# Patient Record
Sex: FEMALE | Race: WHITE | NOT HISPANIC OR LATINO | URBAN - METROPOLITAN AREA
[De-identification: names, ages, dates, MRNs, and addresses within clinical notes are randomized per-mention and may not be internally consistent; named-entity substitution may affect disease eponyms.]

---

## 2023-08-23 ENCOUNTER — APPOINTMENT (RX ONLY)
Dept: URBAN - METROPOLITAN AREA CLINIC 66 | Facility: CLINIC | Age: 53
Setting detail: DERMATOLOGY
End: 2023-08-23

## 2023-08-23 DIAGNOSIS — Z71.89 OTHER SPECIFIED COUNSELING: ICD-10-CM

## 2023-08-23 DIAGNOSIS — L57.0 ACTINIC KERATOSIS: ICD-10-CM

## 2023-08-23 DIAGNOSIS — L21.8 OTHER SEBORRHEIC DERMATITIS: ICD-10-CM

## 2023-08-23 DIAGNOSIS — L82.0 INFLAMED SEBORRHEIC KERATOSIS: ICD-10-CM

## 2023-08-23 DIAGNOSIS — L44.8 OTHER SPECIFIED PAPULOSQUAMOUS DISORDERS: ICD-10-CM

## 2023-08-23 DIAGNOSIS — D18.0 HEMANGIOMA: ICD-10-CM

## 2023-08-23 PROBLEM — D18.01 HEMANGIOMA OF SKIN AND SUBCUTANEOUS TISSUE: Status: ACTIVE | Noted: 2023-08-23

## 2023-08-23 PROCEDURE — 17110 DESTRUCTION B9 LES UP TO 14: CPT

## 2023-08-23 PROCEDURE — ? LIQUID NITROGEN

## 2023-08-23 PROCEDURE — 17000 DESTRUCT PREMALG LESION: CPT | Mod: 59

## 2023-08-23 PROCEDURE — 99203 OFFICE O/P NEW LOW 30 MIN: CPT | Mod: 25

## 2023-08-23 PROCEDURE — ? COUNSELING

## 2023-08-23 ASSESSMENT — LOCATION DETAILED DESCRIPTION DERM
LOCATION DETAILED: LEFT INFERIOR FOREHEAD
LOCATION DETAILED: RIGHT CENTRAL FRONTAL SCALP
LOCATION DETAILED: LEFT ANTERIOR PROXIMAL THIGH
LOCATION DETAILED: RIGHT SUPERIOR LATERAL FOREHEAD
LOCATION DETAILED: LEFT LATERAL SUPERIOR CHEST

## 2023-08-23 ASSESSMENT — LOCATION ZONE DERM
LOCATION ZONE: SCALP
LOCATION ZONE: LEG
LOCATION ZONE: TRUNK
LOCATION ZONE: FACE

## 2023-08-23 ASSESSMENT — LOCATION SIMPLE DESCRIPTION DERM
LOCATION SIMPLE: CHEST
LOCATION SIMPLE: LEFT FOREHEAD
LOCATION SIMPLE: LEFT THIGH
LOCATION SIMPLE: RIGHT SCALP
LOCATION SIMPLE: RIGHT FOREHEAD

## 2023-08-23 NOTE — PROCEDURE: COUNSELING
Detail Level: Simple
Detail Level: Detailed
Topical Steroid Recommendations: Hydrocortisone 1% OTC for 1-2 weeks at a time for flares

## 2023-08-23 NOTE — PROCEDURE: LIQUID NITROGEN
Show Spray Paint Technique Variable?: Yes
Duration Of Freeze Thaw-Cycle (Seconds): 5-10
Detail Level: Detailed
Spray Paint Text: The liquid nitrogen was applied to the skin utilizing a spray paint frosting technique.
Add 52 Modifier (Optional): no
Post-Care Instructions: I reviewed with the patient in detail post-care instructions. Patient is to wear sunprotection, and avoid picking at any of the treated lesions. Pt may apply Vaseline to crusted or scabbing areas.
Medical Necessity Information: It is in your best interest to select a reason for this procedure from the list below. All of these items fulfill various CMS LCD requirements except the new and changing color options.
Consent: The patient's consent was obtained including but not limited to risks of crusting, scabbing, blistering, scarring, darker or lighter pigmentary change, recurrence, incomplete removal and infection.
Medical Necessity Clause: This procedure was medically necessary because the lesions that were treated were:
Number Of Freeze-Thaw Cycles: 2 freeze-thaw cycles
Duration Of Freeze Thaw-Cycle (Seconds): 5
Detail Level: Simple

## 2024-05-15 ENCOUNTER — APPOINTMENT (RX ONLY)
Dept: URBAN - METROPOLITAN AREA CLINIC 66 | Facility: CLINIC | Age: 54
Setting detail: DERMATOLOGY
End: 2024-05-15

## 2024-05-15 DIAGNOSIS — D22 MELANOCYTIC NEVI: ICD-10-CM

## 2024-05-15 DIAGNOSIS — L81.4 OTHER MELANIN HYPERPIGMENTATION: ICD-10-CM

## 2024-05-15 DIAGNOSIS — Z71.89 OTHER SPECIFIED COUNSELING: ICD-10-CM

## 2024-05-15 DIAGNOSIS — D485 NEOPLASM OF UNCERTAIN BEHAVIOR OF SKIN: ICD-10-CM

## 2024-05-15 DIAGNOSIS — Z85.828 PERSONAL HISTORY OF OTHER MALIGNANT NEOPLASM OF SKIN: ICD-10-CM

## 2024-05-15 DIAGNOSIS — D18.0 HEMANGIOMA: ICD-10-CM

## 2024-05-15 PROBLEM — D22.62 MELANOCYTIC NEVI OF LEFT UPPER LIMB, INCLUDING SHOULDER: Status: ACTIVE | Noted: 2024-05-15

## 2024-05-15 PROBLEM — D18.01 HEMANGIOMA OF SKIN AND SUBCUTANEOUS TISSUE: Status: ACTIVE | Noted: 2024-05-15

## 2024-05-15 PROBLEM — D22.5 MELANOCYTIC NEVI OF TRUNK: Status: ACTIVE | Noted: 2024-05-15

## 2024-05-15 PROBLEM — D22.71 MELANOCYTIC NEVI OF RIGHT LOWER LIMB, INCLUDING HIP: Status: ACTIVE | Noted: 2024-05-15

## 2024-05-15 PROBLEM — D22.61 MELANOCYTIC NEVI OF RIGHT UPPER LIMB, INCLUDING SHOULDER: Status: ACTIVE | Noted: 2024-05-15

## 2024-05-15 PROBLEM — D48.5 NEOPLASM OF UNCERTAIN BEHAVIOR OF SKIN: Status: ACTIVE | Noted: 2024-05-15

## 2024-05-15 PROBLEM — D22.72 MELANOCYTIC NEVI OF LEFT LOWER LIMB, INCLUDING HIP: Status: ACTIVE | Noted: 2024-05-15

## 2024-05-15 PROBLEM — D22.39 MELANOCYTIC NEVI OF OTHER PARTS OF FACE: Status: ACTIVE | Noted: 2024-05-15

## 2024-05-15 PROCEDURE — ? COUNSELING

## 2024-05-15 PROCEDURE — 11103 TANGNTL BX SKIN EA SEP/ADDL: CPT

## 2024-05-15 PROCEDURE — 11102 TANGNTL BX SKIN SINGLE LES: CPT

## 2024-05-15 PROCEDURE — ? BIOPSY BY SHAVE METHOD

## 2024-05-15 PROCEDURE — 99213 OFFICE O/P EST LOW 20 MIN: CPT | Mod: 25

## 2024-05-15 ASSESSMENT — LOCATION SIMPLE DESCRIPTION DERM
LOCATION SIMPLE: RIGHT POSTERIOR THIGH
LOCATION SIMPLE: LEFT FOREARM
LOCATION SIMPLE: RIGHT UPPER ARM
LOCATION SIMPLE: LEFT POSTERIOR THIGH
LOCATION SIMPLE: LEFT UPPER BACK
LOCATION SIMPLE: CHEST
LOCATION SIMPLE: RIGHT THIGH
LOCATION SIMPLE: LEFT THIGH
LOCATION SIMPLE: RIGHT POSTERIOR UPPER ARM
LOCATION SIMPLE: LEFT POSTERIOR UPPER ARM
LOCATION SIMPLE: RIGHT PRETIBIAL REGION
LOCATION SIMPLE: LEFT NOSE
LOCATION SIMPLE: LEFT CLAVICULAR SKIN
LOCATION SIMPLE: LEFT PRETIBIAL REGION
LOCATION SIMPLE: RIGHT BUTTOCK
LOCATION SIMPLE: RIGHT FOREARM
LOCATION SIMPLE: LEFT BUTTOCK
LOCATION SIMPLE: LEFT SHOULDER
LOCATION SIMPLE: HAIR

## 2024-05-15 ASSESSMENT — LOCATION DETAILED DESCRIPTION DERM
LOCATION DETAILED: LEFT POSTERIOR SHOULDER
LOCATION DETAILED: LEFT CLAVICULAR SKIN
LOCATION DETAILED: HAIR
LOCATION DETAILED: RIGHT ANTERIOR PROXIMAL THIGH
LOCATION DETAILED: LEFT BUTTOCK
LOCATION DETAILED: RIGHT ANTERIOR DISTAL THIGH
LOCATION DETAILED: LEFT SUPERIOR MEDIAL UPPER BACK
LOCATION DETAILED: LEFT ANTERIOR PROXIMAL THIGH
LOCATION DETAILED: LEFT PROXIMAL POSTERIOR UPPER ARM
LOCATION DETAILED: LEFT NASAL ALAR GROOVE
LOCATION DETAILED: LEFT VENTRAL PROXIMAL FOREARM
LOCATION DETAILED: RIGHT PROXIMAL PRETIBIAL REGION
LOCATION DETAILED: LEFT ANTERIOR DISTAL THIGH
LOCATION DETAILED: RIGHT LATERAL SUPERIOR CHEST
LOCATION DETAILED: RIGHT PROXIMAL POSTERIOR UPPER ARM
LOCATION DETAILED: LEFT PROXIMAL POSTERIOR THIGH
LOCATION DETAILED: RIGHT PROXIMAL POSTERIOR THIGH
LOCATION DETAILED: LEFT PROXIMAL PRETIBIAL REGION
LOCATION DETAILED: RIGHT ANTERIOR PROXIMAL UPPER ARM
LOCATION DETAILED: RIGHT BUTTOCK
LOCATION DETAILED: RIGHT VENTRAL PROXIMAL FOREARM

## 2024-05-15 ASSESSMENT — LOCATION ZONE DERM
LOCATION ZONE: LEG
LOCATION ZONE: SCALP
LOCATION ZONE: ARM
LOCATION ZONE: NOSE
LOCATION ZONE: TRUNK

## 2024-05-15 NOTE — PROCEDURE: COUNSELING
Detail Level: Simple
Detail Level: Zone
Topical Retinoids Recommendations: Retinol
Sunscreen Recommendations: SPF 30-50+ daily
Detail Level: Detailed

## 2024-05-15 NOTE — PROCEDURE: BIOPSY BY SHAVE METHOD
Detail Level: Detailed
Depth Of Biopsy: dermis
Was A Bandage Applied: Yes
Size Of Lesion In Cm: 0.7
X Size Of Lesion In Cm: 0
Biopsy Type: H and E
Biopsy Method: Dermablade
Anesthesia Type: 1% lidocaine with epinephrine
Anesthesia Volume In Cc: 0.5
Hemostasis: Aluminum Chloride and Electrocautery
Wound Care: Petrolatum
Dressing: bandage
Destruction After The Procedure: No
Type Of Destruction Used: Curettage
Curettage Text: The wound bed was treated with curettage after the biopsy was performed.
Cryotherapy Text: The wound bed was treated with cryotherapy after the biopsy was performed.
Electrodesiccation Text: The wound bed was treated with electrodesiccation after the biopsy was performed.
Electrodesiccation And Curettage Text: The wound bed was treated with electrodesiccation and curettage after the biopsy was performed.
Silver Nitrate Text: The wound bed was treated with silver nitrate after the biopsy was performed.
Lab: [id number]
Lab Facility: [id number]
Consent: Written consent was obtained and risks were reviewed including but not limited to scarring, infection, bleeding, scabbing, incomplete removal, nerve damage and allergy to anesthesia.
Post-Care Instructions: I reviewed with the patient in detail post-care instructions. Patient is to keep the biopsy site dry overnight, and then apply bacitracin twice daily until healed. Patient may apply hydrogen peroxide soaks to remove any crusting.
Notification Instructions: Patient will be notified of biopsy results. However, patient instructed to call the office if not contacted within 2 weeks.
Billing Type: Third-Party Bill
Information: Selecting Yes will display possible errors in your note based on the variables you have selected. This validation is only offered as a suggestion for you. PLEASE NOTE THAT THE VALIDATION TEXT WILL BE REMOVED WHEN YOU FINALIZE YOUR NOTE. IF YOU WANT TO FAX A PRELIMINARY NOTE YOU WILL NEED TO TOGGLE THIS TO 'NO' IF YOU DO NOT WANT IT IN YOUR FAXED NOTE.

## 2024-05-20 ENCOUNTER — RX ONLY (OUTPATIENT)
Age: 54
Setting detail: RX ONLY
End: 2024-05-20

## 2024-05-20 RX ORDER — IMIQUIMOD 12.5 MG/.25G
CREAM TOPICAL
Qty: 24 | Refills: 0 | Status: ERX | COMMUNITY
Start: 2024-05-20

## 2024-10-15 ENCOUNTER — APPOINTMENT (RX ONLY)
Dept: URBAN - METROPOLITAN AREA CLINIC 66 | Facility: CLINIC | Age: 54
Setting detail: DERMATOLOGY
End: 2024-10-15

## 2024-10-15 DIAGNOSIS — L90.5 SCAR CONDITIONS AND FIBROSIS OF SKIN: ICD-10-CM

## 2024-10-15 DIAGNOSIS — D18.0 HEMANGIOMA: ICD-10-CM

## 2024-10-15 DIAGNOSIS — D22 MELANOCYTIC NEVI: ICD-10-CM

## 2024-10-15 DIAGNOSIS — Z12.83 ENCOUNTER FOR SCREENING FOR MALIGNANT NEOPLASM OF SKIN: ICD-10-CM

## 2024-10-15 DIAGNOSIS — L73.8 OTHER SPECIFIED FOLLICULAR DISORDERS: ICD-10-CM

## 2024-10-15 PROBLEM — D22.72 MELANOCYTIC NEVI OF LEFT LOWER LIMB, INCLUDING HIP: Status: ACTIVE | Noted: 2024-10-15

## 2024-10-15 PROBLEM — D22.5 MELANOCYTIC NEVI OF TRUNK: Status: ACTIVE | Noted: 2024-10-15

## 2024-10-15 PROBLEM — D18.01 HEMANGIOMA OF SKIN AND SUBCUTANEOUS TISSUE: Status: ACTIVE | Noted: 2024-10-15

## 2024-10-15 PROBLEM — D22.62 MELANOCYTIC NEVI OF LEFT UPPER LIMB, INCLUDING SHOULDER: Status: ACTIVE | Noted: 2024-10-15

## 2024-10-15 PROBLEM — D22.61 MELANOCYTIC NEVI OF RIGHT UPPER LIMB, INCLUDING SHOULDER: Status: ACTIVE | Noted: 2024-10-15

## 2024-10-15 PROBLEM — D22.71 MELANOCYTIC NEVI OF RIGHT LOWER LIMB, INCLUDING HIP: Status: ACTIVE | Noted: 2024-10-15

## 2024-10-15 PROBLEM — D22.39 MELANOCYTIC NEVI OF OTHER PARTS OF FACE: Status: ACTIVE | Noted: 2024-10-15

## 2024-10-15 PROBLEM — D23.71 OTHER BENIGN NEOPLASM OF SKIN OF RIGHT LOWER LIMB, INCLUDING HIP: Status: ACTIVE | Noted: 2024-10-15

## 2024-10-15 PROCEDURE — 99213 OFFICE O/P EST LOW 20 MIN: CPT

## 2024-10-15 PROCEDURE — ? COUNSELING

## 2024-10-15 ASSESSMENT — LOCATION DETAILED DESCRIPTION DERM
LOCATION DETAILED: RIGHT DISTAL PRETIBIAL REGION
LOCATION DETAILED: LEFT PROXIMAL PRETIBIAL REGION
LOCATION DETAILED: RIGHT MEDIAL BREAST 4-5:00 REGION
LOCATION DETAILED: LEFT CENTRAL MALAR CHEEK
LOCATION DETAILED: LEFT MEDIAL BREAST 9-10:00 REGION
LOCATION DETAILED: RIGHT ANTERIOR PROXIMAL UPPER ARM
LOCATION DETAILED: LEFT MEDIAL SUPERIOR CHEST
LOCATION DETAILED: RIGHT PROXIMAL PRETIBIAL REGION
LOCATION DETAILED: LEFT SUPERIOR UPPER BACK
LOCATION DETAILED: LEFT INFERIOR NASAL CHEEK
LOCATION DETAILED: LEFT MID-UPPER BACK
LOCATION DETAILED: RIGHT INFERIOR MEDIAL MIDBACK
LOCATION DETAILED: RIGHT MID-UPPER BACK
LOCATION DETAILED: LEFT SUPERIOR LATERAL LOWER BACK
LOCATION DETAILED: PERIUMBILICAL SKIN
LOCATION DETAILED: LEFT DISTAL POSTERIOR UPPER ARM
LOCATION DETAILED: LEFT VENTRAL PROXIMAL FOREARM

## 2024-10-15 ASSESSMENT — LOCATION ZONE DERM
LOCATION ZONE: TRUNK
LOCATION ZONE: ARM
LOCATION ZONE: FACE
LOCATION ZONE: LEG

## 2024-10-15 ASSESSMENT — LOCATION SIMPLE DESCRIPTION DERM
LOCATION SIMPLE: RIGHT LOWER BACK
LOCATION SIMPLE: LEFT FOREARM
LOCATION SIMPLE: ABDOMEN
LOCATION SIMPLE: CHEST
LOCATION SIMPLE: LEFT PRETIBIAL REGION
LOCATION SIMPLE: LEFT LOWER BACK
LOCATION SIMPLE: LEFT CHEEK
LOCATION SIMPLE: LEFT POSTERIOR UPPER ARM
LOCATION SIMPLE: LEFT UPPER BACK
LOCATION SIMPLE: LEFT BREAST
LOCATION SIMPLE: RIGHT UPPER BACK
LOCATION SIMPLE: RIGHT BREAST
LOCATION SIMPLE: RIGHT UPPER ARM
LOCATION SIMPLE: RIGHT PRETIBIAL REGION

## 2025-08-27 ENCOUNTER — APPOINTMENT (OUTPATIENT)
Dept: URBAN - METROPOLITAN AREA CLINIC 66 | Facility: CLINIC | Age: 55
Setting detail: DERMATOLOGY
End: 2025-08-27

## 2025-08-27 DIAGNOSIS — L73.8 OTHER SPECIFIED FOLLICULAR DISORDERS: ICD-10-CM

## 2025-08-27 DIAGNOSIS — L81.4 OTHER MELANIN HYPERPIGMENTATION: ICD-10-CM

## 2025-08-27 DIAGNOSIS — D22 MELANOCYTIC NEVI: ICD-10-CM

## 2025-08-27 DIAGNOSIS — Z12.83 ENCOUNTER FOR SCREENING FOR MALIGNANT NEOPLASM OF SKIN: ICD-10-CM

## 2025-08-27 DIAGNOSIS — D18.0 HEMANGIOMA: ICD-10-CM

## 2025-08-27 DIAGNOSIS — L82.0 INFLAMED SEBORRHEIC KERATOSIS: ICD-10-CM

## 2025-08-27 PROBLEM — D22.62 MELANOCYTIC NEVI OF LEFT UPPER LIMB, INCLUDING SHOULDER: Status: ACTIVE | Noted: 2025-08-27

## 2025-08-27 PROBLEM — D22.72 MELANOCYTIC NEVI OF LEFT LOWER LIMB, INCLUDING HIP: Status: ACTIVE | Noted: 2025-08-27

## 2025-08-27 PROBLEM — D22.71 MELANOCYTIC NEVI OF RIGHT LOWER LIMB, INCLUDING HIP: Status: ACTIVE | Noted: 2025-08-27

## 2025-08-27 PROBLEM — D18.01 HEMANGIOMA OF SKIN AND SUBCUTANEOUS TISSUE: Status: ACTIVE | Noted: 2025-08-27

## 2025-08-27 PROBLEM — D22.61 MELANOCYTIC NEVI OF RIGHT UPPER LIMB, INCLUDING SHOULDER: Status: ACTIVE | Noted: 2025-08-27

## 2025-08-27 PROBLEM — D22.5 MELANOCYTIC NEVI OF TRUNK: Status: ACTIVE | Noted: 2025-08-27

## 2025-08-27 PROCEDURE — ? LIQUID NITROGEN

## 2025-08-27 PROCEDURE — ? COUNSELING

## 2025-08-27 ASSESSMENT — LOCATION SIMPLE DESCRIPTION DERM
LOCATION SIMPLE: RIGHT FOREARM
LOCATION SIMPLE: RIGHT UPPER ARM
LOCATION SIMPLE: LEFT UPPER ARM
LOCATION SIMPLE: RIGHT PRETIBIAL REGION
LOCATION SIMPLE: LEFT THIGH
LOCATION SIMPLE: LEFT SHOULDER
LOCATION SIMPLE: LEFT POSTERIOR THIGH
LOCATION SIMPLE: LEFT CLAVICULAR SKIN
LOCATION SIMPLE: LEFT PRETIBIAL REGION
LOCATION SIMPLE: LEFT CHEEK
LOCATION SIMPLE: LEFT ANKLE
LOCATION SIMPLE: RIGHT POSTERIOR THIGH
LOCATION SIMPLE: RIGHT SHOULDER
LOCATION SIMPLE: RIGHT POPLITEAL SKIN
LOCATION SIMPLE: RIGHT LOWER BACK
LOCATION SIMPLE: RIGHT CALF
LOCATION SIMPLE: RIGHT POSTERIOR UPPER ARM
LOCATION SIMPLE: RIGHT THIGH
LOCATION SIMPLE: LEFT CALF
LOCATION SIMPLE: RIGHT BREAST
LOCATION SIMPLE: LEFT FOREARM
LOCATION SIMPLE: ABDOMEN
LOCATION SIMPLE: LEFT BREAST
LOCATION SIMPLE: LEFT UPPER BACK
LOCATION SIMPLE: LEFT KNEE

## 2025-08-27 ASSESSMENT — LOCATION DETAILED DESCRIPTION DERM
LOCATION DETAILED: RIGHT DISTAL PRETIBIAL REGION
LOCATION DETAILED: LEFT ANTERIOR PROXIMAL UPPER ARM
LOCATION DETAILED: PERIUMBILICAL SKIN
LOCATION DETAILED: RIGHT PROXIMAL PRETIBIAL REGION
LOCATION DETAILED: RIGHT PROXIMAL CALF
LOCATION DETAILED: LEFT CLAVICULAR SKIN
LOCATION DETAILED: LEFT VENTRAL DISTAL FOREARM
LOCATION DETAILED: RIGHT POPLITEAL SKIN
LOCATION DETAILED: RIGHT MEDIAL BREAST 1-2:00 REGION
LOCATION DETAILED: LEFT PROXIMAL POSTERIOR THIGH
LOCATION DETAILED: LEFT DISTAL POSTERIOR THIGH
LOCATION DETAILED: LEFT INFERIOR MEDIAL UPPER BACK
LOCATION DETAILED: LEFT KNEE
LOCATION DETAILED: LEFT MEDIAL BREAST 7-8:00 REGION
LOCATION DETAILED: LEFT ANTERIOR DISTAL THIGH
LOCATION DETAILED: LEFT DISTAL CALF
LOCATION DETAILED: RIGHT PROXIMAL POSTERIOR THIGH
LOCATION DETAILED: RIGHT ANTERIOR DISTAL THIGH
LOCATION DETAILED: LEFT POSTERIOR SHOULDER
LOCATION DETAILED: RIGHT SUPERIOR MEDIAL MIDBACK
LOCATION DETAILED: RIGHT ANTERIOR PROXIMAL UPPER ARM
LOCATION DETAILED: LEFT SUPERIOR UPPER BACK
LOCATION DETAILED: RIGHT PROXIMAL POSTERIOR UPPER ARM
LOCATION DETAILED: RIGHT PROXIMAL DORSAL FOREARM
LOCATION DETAILED: LEFT MEDIAL MALAR CHEEK
LOCATION DETAILED: LEFT PROXIMAL DORSAL FOREARM
LOCATION DETAILED: LEFT POSTERIOR ANKLE
LOCATION DETAILED: RIGHT POSTERIOR SHOULDER
LOCATION DETAILED: RIGHT ANTERIOR SHOULDER
LOCATION DETAILED: RIGHT ANTERIOR PROXIMAL THIGH
LOCATION DETAILED: LEFT DISTAL PRETIBIAL REGION

## 2025-08-27 ASSESSMENT — LOCATION ZONE DERM
LOCATION ZONE: TRUNK
LOCATION ZONE: ARM
LOCATION ZONE: LEG
LOCATION ZONE: FACE